# Patient Record
Sex: FEMALE | Race: WHITE | NOT HISPANIC OR LATINO | Employment: FULL TIME | URBAN - NONMETROPOLITAN AREA
[De-identification: names, ages, dates, MRNs, and addresses within clinical notes are randomized per-mention and may not be internally consistent; named-entity substitution may affect disease eponyms.]

---

## 2023-03-02 LAB — SARS-COV-2 RESULT: DETECTED

## 2023-08-17 ENCOUNTER — APPOINTMENT (OUTPATIENT)
Dept: PRIMARY CARE | Facility: CLINIC | Age: 43
End: 2023-08-17
Payer: COMMERCIAL

## 2023-08-26 PROBLEM — Z97.5 IUD CONTRACEPTION: Status: ACTIVE | Noted: 2023-08-26

## 2023-08-26 PROBLEM — E66.01 MORBID OBESITY (MULTI): Status: ACTIVE | Noted: 2023-08-26

## 2023-08-26 PROBLEM — H35.051 CHOROIDAL NEOVASCULARIZATION OF RIGHT EYE: Status: ACTIVE | Noted: 2023-08-26

## 2023-08-26 PROBLEM — R92.8 ABNORMAL MAMMOGRAM: Status: ACTIVE | Noted: 2023-08-26

## 2023-08-26 RX ORDER — MULTIVITAMIN
TABLET ORAL
COMMUNITY

## 2023-08-26 RX ORDER — LEVONORGESTREL 52 MG/1
INTRAUTERINE DEVICE INTRAUTERINE
COMMUNITY

## 2023-08-28 NOTE — PROGRESS NOTES
"Subjective   Patient ID: Kandy Mcmullen is a 42 y.o. female who presents for Annual Exam and weight concerns .    HPI     The patient presents for her annual wellness exam. GYN - Vin[facility]   Last pap/cervical cancer screenin NILM   Mammogram - - due; no breast concerns   LMP/menstrual cycles: yes- spotting randomly   Contraception: Mirena   History of depression or anxiety: no   Immunizations: tdap    Diet: \"not horrible\"- watching portion size, more veggies and chicken and turkey, less red meat ; probably too much pizza ; alcohol once a week   Exercise: Not getting regular exercise; limited by time ; some walking but not consistent   Interested in weight loss medications     Review of Systems   Constitutional:  Negative for chills, fatigue and fever.   HENT:  Negative for congestion, ear pain and sore throat.    Eyes:  Negative for visual disturbance.   Respiratory:  Negative for cough and shortness of breath.    Cardiovascular:  Negative for chest pain, palpitations and leg swelling.   Gastrointestinal:  Negative for abdominal pain, constipation, diarrhea, nausea and vomiting.   Genitourinary: Negative.    Musculoskeletal: Negative.    Skin:  Negative for rash.   Neurological:  Negative for dizziness, weakness, numbness and headaches.   Psychiatric/Behavioral: Negative.         Wt Readings from Last 3 Encounters:   23 120 kg (263 lb 9.6 oz)   23 118 kg (260 lb 3 oz)   22 119 kg (263 lb 3 oz)       Objective   /73 (BP Location: Right arm, Patient Position: Sitting, BP Cuff Size: Large adult)   Pulse 66   Temp 36.8 °C (98.3 °F) (Temporal)   Resp 16   Ht 1.664 m (5' 5.5\")   Wt 120 kg (263 lb 9.6 oz)   LMP 08/15/2023   SpO2 98%   BMI 43.20 kg/m²     Physical Exam    Constitutional: Well developed, well nourished, alert and in no acute distress.  Head and Face: Normocephalic, atraumatic.  Eyes: Normal external exam. Pupils equally round and reactive to light with normal " accommodation and extraocular movements intact.   ENT: External inspection of ears normal, tympanic membranes visualized and normal. Nasal mucosa, septum, and turbinates normal. Oral mucosa moist, oropharynx clear.   Neck: Supple, no lymphadenopathy or masses. Thyroid not enlarged, no palpable nodules.   Cardiovascular: Regular rate and rhythm, normal S1 and S2, no murmurs, gallops, or rubs. Radial pulses normal. No peripheral edema. No carotid bruits.   Pulmonary: No respiratory distress, lungs clear to auscultation bilaterally. No wheezes, rhonchi, rales.   Breasts: Normal appearance, no palpable masses or axillary lymphadenopathy, nipple discharge or inversion, skin changes.   Abdomen: Soft, nontender, nondistended, normal bowel sounds. No masses palpated.   Musculoskeletal: Gait normal. Muscle strength/tone normal of all 4 extremities. Normal range of motion of all extremities.   Skin: Warm, well perfused, normal skin turgor and color, no lesions or rashes noted.   Neurologic: Cranial nerves II-XII grossly intact.  Sensation normal bilaterally.   Psychiatric: Mood calm and affect normal.      Assessment/Plan   Problem List Items Addressed This Visit       IUD contraception    Class 3 severe obesity without serious comorbidity with body mass index (BMI) of 40.0 to 44.9 in adult (CMS/[facility])    Relevant Orders    Referral to Clinical Pharmacy    Referral to Comprehensive Weight Loss     Other Visit Diagnoses       Annual physical exam    -  Primary    Relevant Orders    CBC and Auto Differential    Comprehensive Metabolic Panel    Hemoglobin A1C    TSH with reflex to Free T4 if abnormal    Lipid Panel    Screening mammogram, encounter for        Relevant Orders    BI mammo bilateral screening tomosynthesis          Franca Pulido,   8/29/2023

## 2023-08-29 ENCOUNTER — LAB (OUTPATIENT)
Dept: LAB | Facility: LAB | Age: 43
End: 2023-08-29
Payer: COMMERCIAL

## 2023-08-29 ENCOUNTER — OFFICE VISIT (OUTPATIENT)
Dept: PRIMARY CARE | Facility: CLINIC | Age: 43
End: 2023-08-29
Payer: COMMERCIAL

## 2023-08-29 VITALS
WEIGHT: 263.6 LBS | TEMPERATURE: 98.3 F | BODY MASS INDEX: 42.36 KG/M2 | RESPIRATION RATE: 16 BRPM | OXYGEN SATURATION: 98 % | HEART RATE: 66 BPM | HEIGHT: 66 IN | DIASTOLIC BLOOD PRESSURE: 73 MMHG | SYSTOLIC BLOOD PRESSURE: 111 MMHG

## 2023-08-29 DIAGNOSIS — Z97.5 IUD CONTRACEPTION: ICD-10-CM

## 2023-08-29 DIAGNOSIS — Z00.00 ANNUAL PHYSICAL EXAM: ICD-10-CM

## 2023-08-29 DIAGNOSIS — E66.01 CLASS 3 SEVERE OBESITY WITHOUT SERIOUS COMORBIDITY WITH BODY MASS INDEX (BMI) OF 40.0 TO 44.9 IN ADULT, UNSPECIFIED OBESITY TYPE (MULTI): ICD-10-CM

## 2023-08-29 DIAGNOSIS — Z12.31 SCREENING MAMMOGRAM, ENCOUNTER FOR: ICD-10-CM

## 2023-08-29 DIAGNOSIS — Z00.00 ANNUAL PHYSICAL EXAM: Primary | ICD-10-CM

## 2023-08-29 PROBLEM — E66.813 CLASS 3 SEVERE OBESITY WITHOUT SERIOUS COMORBIDITY WITH BODY MASS INDEX (BMI) OF 40.0 TO 44.9 IN ADULT: Status: ACTIVE | Noted: 2023-08-26

## 2023-08-29 LAB
ALANINE AMINOTRANSFERASE (SGPT) (U/L) IN SER/PLAS: 14 U/L (ref 7–45)
ALBUMIN (G/DL) IN SER/PLAS: 4.5 G/DL (ref 3.4–5)
ALKALINE PHOSPHATASE (U/L) IN SER/PLAS: 77 U/L (ref 33–110)
ANION GAP IN SER/PLAS: 11 MMOL/L (ref 10–20)
ASPARTATE AMINOTRANSFERASE (SGOT) (U/L) IN SER/PLAS: 13 U/L (ref 9–39)
BASOPHILS (10*3/UL) IN BLOOD BY AUTOMATED COUNT: 0.06 X10E9/L (ref 0–0.1)
BASOPHILS/100 LEUKOCYTES IN BLOOD BY AUTOMATED COUNT: 0.7 % (ref 0–2)
BILIRUBIN TOTAL (MG/DL) IN SER/PLAS: 1 MG/DL (ref 0–1.2)
CALCIUM (MG/DL) IN SER/PLAS: 9.6 MG/DL (ref 8.6–10.6)
CARBON DIOXIDE, TOTAL (MMOL/L) IN SER/PLAS: 27 MMOL/L (ref 21–32)
CHLORIDE (MMOL/L) IN SER/PLAS: 106 MMOL/L (ref 98–107)
CHOLESTEROL (MG/DL) IN SER/PLAS: 125 MG/DL (ref 0–199)
CHOLESTEROL IN HDL (MG/DL) IN SER/PLAS: 46 MG/DL
CHOLESTEROL/HDL RATIO: 2.7
CREATININE (MG/DL) IN SER/PLAS: 0.72 MG/DL (ref 0.5–1.05)
EOSINOPHILS (10*3/UL) IN BLOOD BY AUTOMATED COUNT: 0.22 X10E9/L (ref 0–0.7)
EOSINOPHILS/100 LEUKOCYTES IN BLOOD BY AUTOMATED COUNT: 2.4 % (ref 0–6)
ERYTHROCYTE DISTRIBUTION WIDTH (RATIO) BY AUTOMATED COUNT: 13.5 % (ref 11.5–14.5)
ERYTHROCYTE MEAN CORPUSCULAR HEMOGLOBIN CONCENTRATION (G/DL) BY AUTOMATED: 33 G/DL (ref 32–36)
ERYTHROCYTE MEAN CORPUSCULAR VOLUME (FL) BY AUTOMATED COUNT: 92 FL (ref 80–100)
ERYTHROCYTES (10*6/UL) IN BLOOD BY AUTOMATED COUNT: 4.87 X10E12/L (ref 4–5.2)
ESTIMATED AVERAGE GLUCOSE FOR HBA1C: 85 MG/DL
GFR FEMALE: >90 ML/MIN/1.73M2
GLUCOSE (MG/DL) IN SER/PLAS: 99 MG/DL (ref 74–99)
HEMATOCRIT (%) IN BLOOD BY AUTOMATED COUNT: 44.9 % (ref 36–46)
HEMOGLOBIN (G/DL) IN BLOOD: 14.8 G/DL (ref 12–16)
HEMOGLOBIN A1C/HEMOGLOBIN TOTAL IN BLOOD: 4.6 %
IMMATURE GRANULOCYTES/100 LEUKOCYTES IN BLOOD BY AUTOMATED COUNT: 0.5 % (ref 0–0.9)
LDL: 51 MG/DL (ref 0–99)
LEUKOCYTES (10*3/UL) IN BLOOD BY AUTOMATED COUNT: 9.2 X10E9/L (ref 4.4–11.3)
LYMPHOCYTES (10*3/UL) IN BLOOD BY AUTOMATED COUNT: 2.03 X10E9/L (ref 1.2–4.8)
LYMPHOCYTES/100 LEUKOCYTES IN BLOOD BY AUTOMATED COUNT: 22 % (ref 13–44)
MONOCYTES (10*3/UL) IN BLOOD BY AUTOMATED COUNT: 0.55 X10E9/L (ref 0.1–1)
MONOCYTES/100 LEUKOCYTES IN BLOOD BY AUTOMATED COUNT: 6 % (ref 2–10)
NEUTROPHILS (10*3/UL) IN BLOOD BY AUTOMATED COUNT: 6.3 X10E9/L (ref 1.2–7.7)
NEUTROPHILS/100 LEUKOCYTES IN BLOOD BY AUTOMATED COUNT: 68.4 % (ref 40–80)
NRBC (PER 100 WBCS) BY AUTOMATED COUNT: 0 /100 WBC (ref 0–0)
PLATELETS (10*3/UL) IN BLOOD AUTOMATED COUNT: 245 X10E9/L (ref 150–450)
POTASSIUM (MMOL/L) IN SER/PLAS: 4.7 MMOL/L (ref 3.5–5.3)
PROTEIN TOTAL: 7.3 G/DL (ref 6.4–8.2)
SODIUM (MMOL/L) IN SER/PLAS: 139 MMOL/L (ref 136–145)
THYROTROPIN (MIU/L) IN SER/PLAS BY DETECTION LIMIT <= 0.05 MIU/L: 2.04 MIU/L (ref 0.44–3.98)
TRIGLYCERIDE (MG/DL) IN SER/PLAS: 139 MG/DL (ref 0–149)
UREA NITROGEN (MG/DL) IN SER/PLAS: 13 MG/DL (ref 6–23)
VLDL: 28 MG/DL (ref 0–40)

## 2023-08-29 PROCEDURE — 83036 HEMOGLOBIN GLYCOSYLATED A1C: CPT

## 2023-08-29 PROCEDURE — 80053 COMPREHEN METABOLIC PANEL: CPT

## 2023-08-29 PROCEDURE — 1036F TOBACCO NON-USER: CPT | Performed by: FAMILY MEDICINE

## 2023-08-29 PROCEDURE — 99396 PREV VISIT EST AGE 40-64: CPT | Performed by: FAMILY MEDICINE

## 2023-08-29 PROCEDURE — 80061 LIPID PANEL: CPT

## 2023-08-29 PROCEDURE — 85025 COMPLETE CBC W/AUTO DIFF WBC: CPT

## 2023-08-29 PROCEDURE — 3008F BODY MASS INDEX DOCD: CPT | Performed by: FAMILY MEDICINE

## 2023-08-29 PROCEDURE — 36415 COLL VENOUS BLD VENIPUNCTURE: CPT

## 2023-08-29 PROCEDURE — 84443 ASSAY THYROID STIM HORMONE: CPT

## 2023-08-29 ASSESSMENT — PATIENT HEALTH QUESTIONNAIRE - PHQ9
1. LITTLE INTEREST OR PLEASURE IN DOING THINGS: NOT AT ALL
2. FEELING DOWN, DEPRESSED OR HOPELESS: NOT AT ALL
SUM OF ALL RESPONSES TO PHQ9 QUESTIONS 1 AND 2: 0

## 2023-08-29 ASSESSMENT — PROMIS GLOBAL HEALTH SCALE
RATE_QUALITY_OF_LIFE: GOOD
RATE_AVERAGE_PAIN: 1
RATE_GENERAL_HEALTH: GOOD
RATE_PHYSICAL_HEALTH: GOOD
CARRYOUT_SOCIAL_ACTIVITIES: GOOD
EMOTIONAL_PROBLEMS: RARELY
CARRYOUT_PHYSICAL_ACTIVITIES: COMPLETELY
RATE_MENTAL_HEALTH: VERY GOOD
RATE_AVERAGE_FATIGUE: MODERATE
RATE_SOCIAL_SATISFACTION: VERY GOOD

## 2023-08-29 ASSESSMENT — ENCOUNTER SYMPTOMS
SHORTNESS OF BREATH: 0
NAUSEA: 0
MUSCULOSKELETAL NEGATIVE: 1
COUGH: 0
SORE THROAT: 0
CONSTIPATION: 0
PSYCHIATRIC NEGATIVE: 1
FATIGUE: 0
WEAKNESS: 0
HEADACHES: 0
DIARRHEA: 0
PALPITATIONS: 0
VOMITING: 0
DIZZINESS: 0
CHILLS: 0
NUMBNESS: 0
FEVER: 0
ABDOMINAL PAIN: 0

## 2023-11-03 ENCOUNTER — TELEPHONE (OUTPATIENT)
Dept: PRIMARY CARE | Facility: CLINIC | Age: 43
End: 2023-11-03
Payer: COMMERCIAL

## 2023-11-03 DIAGNOSIS — R05.1 ACUTE COUGH: Primary | ICD-10-CM

## 2023-11-03 RX ORDER — BENZONATATE 100 MG/1
100 CAPSULE ORAL 3 TIMES DAILY PRN
Qty: 42 CAPSULE | Refills: 0 | Status: SHIPPED | OUTPATIENT
Start: 2023-11-03 | End: 2023-12-03

## 2023-11-03 NOTE — TELEPHONE ENCOUNTER
Patient is aware and will try the Rx if still feeling sick she will call on Monday for an acute appt.

## 2023-11-03 NOTE — TELEPHONE ENCOUNTER
----- Message from Ifeoma Taylor MA sent at 11/3/2023 10:55 AM EDT -----  Regarding: FW: Cough that just won't stop  Contact: [phone]    ----- Message -----  From: Kandy Mcmullen  Sent: 11/3/2023  10:49 AM EDT  To: [place] [address] Clinical Support Staff  Subject: Cough that just won't stop                       Hi,  I started Tuesday, Oct 24th not feeling well. Run down, sore throat and sinus drainage. Felt better by Wednesday evening. Then about Thursday started to cough every so often...I thought it was going away then the last couple of days seems to be worse. 2 of the last 3 night I have woken up coughing... Slightly runny nose (clear) and this cough that just won't quit. I go through coughing fits from time to time. Just wondering if you have any ideas to help or if I should make an appointment to be seen?    Thanks!!

## 2023-11-03 NOTE — TELEPHONE ENCOUNTER
Rx sent for tessalon (prescription cough pill) that she can use as needed    Can offer acute appt for her to be seen as well in open slot

## 2023-11-06 ENCOUNTER — OFFICE VISIT (OUTPATIENT)
Dept: PRIMARY CARE | Facility: CLINIC | Age: 43
End: 2023-11-06
Payer: COMMERCIAL

## 2023-11-06 VITALS
SYSTOLIC BLOOD PRESSURE: 127 MMHG | OXYGEN SATURATION: 96 % | DIASTOLIC BLOOD PRESSURE: 81 MMHG | RESPIRATION RATE: 16 BRPM | HEART RATE: 87 BPM | TEMPERATURE: 97.5 F | BODY MASS INDEX: 43.84 KG/M2 | WEIGHT: 267.5 LBS

## 2023-11-06 DIAGNOSIS — J06.9 BACTERIAL UPPER RESPIRATORY INFECTION: Primary | ICD-10-CM

## 2023-11-06 DIAGNOSIS — B96.89 BACTERIAL UPPER RESPIRATORY INFECTION: Primary | ICD-10-CM

## 2023-11-06 PROBLEM — O09.529 ADVANCED MATERNAL AGE IN MULTIGRAVIDA (HHS-HCC): Status: ACTIVE | Noted: 2018-09-01

## 2023-11-06 PROCEDURE — 3008F BODY MASS INDEX DOCD: CPT | Performed by: FAMILY MEDICINE

## 2023-11-06 PROCEDURE — 99213 OFFICE O/P EST LOW 20 MIN: CPT | Performed by: FAMILY MEDICINE

## 2023-11-06 PROCEDURE — 1036F TOBACCO NON-USER: CPT | Performed by: FAMILY MEDICINE

## 2023-11-06 RX ORDER — DOXYCYCLINE 100 MG/1
CAPSULE ORAL
Qty: 14 CAPSULE | Refills: 0 | Status: SHIPPED | OUTPATIENT
Start: 2023-11-06

## 2023-11-06 RX ORDER — ALBUTEROL SULFATE 90 UG/1
2 AEROSOL, METERED RESPIRATORY (INHALATION) EVERY 4 HOURS PRN
Qty: 18 G | Refills: 0 | Status: SHIPPED | OUTPATIENT
Start: 2023-11-06 | End: 2024-11-05

## 2023-11-06 ASSESSMENT — ENCOUNTER SYMPTOMS
SWEATS: 0
CHILLS: 0
WHEEZING: 0
HEMOPTYSIS: 0
WEIGHT LOSS: 0
HEARTBURN: 0
MYALGIAS: 0
SORE THROAT: 0
COUGH: 1
HEADACHES: 1
FEVER: 0
SHORTNESS OF BREATH: 0
RHINORRHEA: 1

## 2023-11-06 ASSESSMENT — PAIN SCALES - GENERAL: PAINLEVEL: 0-NO PAIN

## 2023-11-06 NOTE — PROGRESS NOTES
Subjective   Patient ID: Kandy Mcmullen is a 43 y.o. female who presents for Cough (Has had it for 2 weeks, had not tested for COVID had a low grade fever for 1/2 a day and felt run down and little sore throat that has all subsided and the cough is just getting worse ).    HPI   Above chief complaint/information reviewed.  Kandy's upper respiratory illness started about 2 weeks ago, had a fever for just a day or so.  She has no ear pain, digestive symptoms, does have a mild sore throat, postnasal drainage.  Her most bothersome symptom is a cough, occasionally dry, occasionally productive.  She does not wheeze, has no shortness of breath.  Over-the-counter natural remedies and Tessalon Perles have not been helpful.  She has 2 immediate family ill contacts.  One of her kids has pneumonia, the other sounds more like a sinus infection, both needed antibiotics.  Review of Systems  The full, 10+ multi-organ review of systems, is within normal limits with the exception of what is noted above in HPI.  Objective   /81 (BP Location: Right arm, Patient Position: Sitting, BP Cuff Size: Large adult)   Pulse 87   Temp 36.4 °C (97.5 °F) (Temporal)   Resp 16   Wt 121 kg (267 lb 8 oz)   SpO2 96%   BMI 43.84 kg/m²     Physical Exam  Cardiac exam reveals a regular rate and rhythm, no murmurs, rubs or gallops present.   Lungs are clear bilaterally.    No lower extremity edema present.  Constitutional/General appearance: alert, oriented, well-appearing, in no distress  Head and face exam is normal  No scleral icterus or conjunctival erythema present  Hearing is grossly normal  Respiratory effort is normal, no dyspnea noted  Cortical function is normal  Mood, affect, are pleasant, appropriate, and interactive.  Insight is normal  Boggy, inflamed nasal turbinates and mucosa, with purulent D/C bilaterally  Lungs are clear bilaterally, slightly decreased air exchange noted.  Posterior pharynx shows drainage  inflammation.  Bilateral submandibular nodes noted, each about 0.75 cm, soft, mildly tender, mobile.  Assessment/Plan     Given the length of time she has had her sinus and bronchitis symptoms, I recommend doxycycline and an albuterol inhaler.  She may take her Tessalon Perles with it, though she does not believe they have been overly helpful.  Lots of fluids, tea/honey, etc. discussed.  If symptoms do not improve in the next several days, consider short course of prednisone.

## 2023-11-06 NOTE — PROGRESS NOTES
Answers submitted by the patient for this visit:  Cough Questionnaire (Submitted on 11/6/2023)  Chief Complaint: Cough  Chronicity: new  Onset: 1 to 4 weeks ago  Progression since onset: waxing and waning  Frequency: every few minutes  Cough characteristics: non-productive, productive of sputum  chest pain: No  chills: No  ear congestion: No  ear pain: No  fever: No  headaches: Yes  heartburn: No  hemoptysis: No  myalgias: No  nasal congestion: Yes  postnasal drip: No  rash: No  rhinorrhea: Yes  shortness of breath: No  sore throat: No  sweats: No  weight loss: No  wheezing: No  Aggravated by: cold air

## 2024-05-02 ENCOUNTER — TELEPHONE (OUTPATIENT)
Dept: PRIMARY CARE | Facility: CLINIC | Age: 44
End: 2024-05-02
Payer: COMMERCIAL

## 2024-05-02 DIAGNOSIS — E66.01 CLASS 3 SEVERE OBESITY WITHOUT SERIOUS COMORBIDITY WITH BODY MASS INDEX (BMI) OF 40.0 TO 44.9 IN ADULT, UNSPECIFIED OBESITY TYPE (MULTI): Primary | ICD-10-CM

## 2024-05-02 NOTE — TELEPHONE ENCOUNTER
----- Message from Trina Aguilar CMA sent at 5/2/2024 11:09 AM EDT -----  Regarding: FW: Weight loss referral?  Contact: [phone]    ----- Message -----  From: Kandy Mcmullen  Sent: 5/2/2024  11:05 AM EDT  To: [address] [place] [address] Clinical Support Staff  Subject: Weight loss referral?                            I called the bariatric number and they told me that they only do weight loss for people wanting surgery or who have had surgery... Not sure where to go next... I realize it has been a bit since the referral but I need to do something and I feel like everything I am trying to is just not working.    Thank you,  Kandy

## 2024-05-02 NOTE — TELEPHONE ENCOUNTER
----- Message from Trina Aguilar CMA sent at 5/2/2024 11:09 AM EDT -----  Regarding: FW: Weight loss referral?  Contact: [phone]    ----- Message -----  From: Kandy Mcmullen  Sent: 5/2/2024  11:05 AM EDT  To: [address] [place] [address] Clinical Support Staff  Subject: Weight loss referral?                            I called the bariatric number and they told me that they only do weight loss for people wanting surgery or who have had surgery... Not sure where to go next... I realize it has been a bit since the referral but I need to do something and I feel like everything I am trying to is just not working.    Thank you,  Kandy

## 2024-09-06 ENCOUNTER — APPOINTMENT (OUTPATIENT)
Dept: ENDOCRINOLOGY | Facility: CLINIC | Age: 44
End: 2024-09-06
Payer: COMMERCIAL

## 2024-09-06 ENCOUNTER — TELEMEDICINE CLINICAL SUPPORT (OUTPATIENT)
Dept: ENDOCRINOLOGY | Facility: CLINIC | Age: 44
End: 2024-09-06
Payer: COMMERCIAL

## 2024-09-06 VITALS
BODY MASS INDEX: 43.33 KG/M2 | SYSTOLIC BLOOD PRESSURE: 123 MMHG | WEIGHT: 269.6 LBS | TEMPERATURE: 97.5 F | DIASTOLIC BLOOD PRESSURE: 79 MMHG | HEIGHT: 66 IN | HEART RATE: 80 BPM

## 2024-09-06 VITALS — WEIGHT: 269.6 LBS | HEIGHT: 66 IN | BODY MASS INDEX: 43.33 KG/M2

## 2024-09-06 DIAGNOSIS — Z71.3 DIETARY COUNSELING: Primary | ICD-10-CM

## 2024-09-06 DIAGNOSIS — E66.01 CLASS 3 SEVERE OBESITY WITHOUT SERIOUS COMORBIDITY WITH BODY MASS INDEX (BMI) OF 40.0 TO 44.9 IN ADULT, UNSPECIFIED OBESITY TYPE (MULTI): Primary | ICD-10-CM

## 2024-09-06 DIAGNOSIS — E66.01 CLASS 3 SEVERE OBESITY WITHOUT SERIOUS COMORBIDITY WITH BODY MASS INDEX (BMI) OF 40.0 TO 44.9 IN ADULT, UNSPECIFIED OBESITY TYPE (MULTI): ICD-10-CM

## 2024-09-06 PROCEDURE — 1036F TOBACCO NON-USER: CPT | Performed by: NURSE PRACTITIONER

## 2024-09-06 PROCEDURE — 3008F BODY MASS INDEX DOCD: CPT | Performed by: NURSE PRACTITIONER

## 2024-09-06 PROCEDURE — 99204 OFFICE O/P NEW MOD 45 MIN: CPT | Performed by: NURSE PRACTITIONER

## 2024-09-06 PROCEDURE — 97802 MEDICAL NUTRITION INDIV IN: CPT | Performed by: DIETITIAN, REGISTERED

## 2024-09-06 NOTE — Clinical Note
Thanks for referral.  She wants to do diet and exercise.  I will connect her with our dietician.  She will look into weight loss medications covered by her insurance.  Discussed her not being candidate for GLP1.   Waleska Kim, HENRY-CNP

## 2024-09-06 NOTE — PROGRESS NOTES
"Initial Nutrition Assessment    Patient was referred to nutrition by MARGARITA Larson for weight management/desire to lose weight.     Diet recall reveals a consistent meal pattern with rare missed meals; however, reported intakes of larger portions and some calorically dense foods all likely contributing to lack of desired weight loss at this time. Fluids meeting recommendations in type and amount with water as primary beverage. See all interventions/recommendations below as discussed during visit this day.     Patient reported symptoms: Difficulty losing weight    Anthropometrics:  Height:   Ht Readings from Last 1 Encounters:   09/06/24 1.676 m (5' 6\")      Weight:   Wt Readings from Last 10 Encounters:   09/06/24 122 kg (269 lb 9.6 oz)   11/06/23 121 kg (267 lb 8 oz)   08/29/23 120 kg (263 lb 9.6 oz)   03/02/23 118 kg (260 lb 3 oz)   06/28/22 119 kg (263 lb 3 oz)   05/11/21 125 kg (274 lb 8 oz)      Current BMI:   BMI Readings from Last 1 Encounters:   09/06/24 43.51 kg/m²        Labs:  Lab Results   Component Value Date    HGBA1C 4.6 08/29/2023      Lab Results   Component Value Date    CHOL 125 08/29/2023    CHOL 118 06/28/2022    CHOL 135 05/11/2021     Lab Results   Component Value Date    HDL 46.0 08/29/2023    HDL 41.5 06/28/2022    HDL 51.6 05/11/2021     No results found for: \"LDLCALC\"  Lab Results   Component Value Date    TRIG 139 08/29/2023    TRIG 147 06/28/2022    TRIG 117 05/11/2021       Malnutrition Screening:  Significant Unintentional weight loss: No  Eating less than 75% of usual intake for more than 2 weeks: No  Potential Signs of Inflammation: No    Recommended Malnutrition Diagnosis: No malnutrition identified    Diet Recall-  Breakfast: egg and cheese sandwich OR cottage cheese OR whole grain toast with butter OR on weekends has eggs and sausage and toast OR pancakes and sausage OR skips when forgets   Lunch- leftovers from dinner  Dinner- various proteins, starches and vegetables "   Snacks- [place] break nut and cheese packs, fruits, nuts, granola bar, veggies    Beverages-coffee with milk and honey, water throughout the day and occasional unsweetened hot or iced tea  Alcohol- beer, wine, cocktails (1 drink per week)    Other pertinent patient reported Information:  - Feels barrier to weight loss is overall portion sizes as well as trying to agree to foods/meals that everyone in the house will eat.    Nutrition Diagnosis: Food and nutrition-related knowledge deficit related to lack of recent exposure to information as evidenced by patient has no prior knowledge of need for food and nutrition-related recommendations.    Readiness to Learn:  Cognitive ability: Alert and oriented  Motivation to learn: Interested  Family Support: Unable to assess- family not present  Instruction provided to: Patient  Patient learns best by: Multiple methods  Factors affecting learning: None   Physical limitations affecting learning: None    Education Materials Provided:   Your Mediterranean Meal Plan Booklet    Nutrition Interventions/Recommendations for 9/6/2024:  - Please refer to your book entitled: Your Mediterranean Meal Plan, and follow Mediterranean Diet eating guidelines as reviewed.  - The Healthy Plate style of eating can be a helpful tool for incorporating healthy balanced meals in appropriate portions. (Healthy Plate: Start with a 9-inch diameter plate. Fill 1/2 the plate with non-starchy vegetables, 1/4 of the plate with whole grains or starchy vegetables, and 1/4 of the plate with a lean source of protein.   - Consider pre-planning healthy meals for the week. Refer to your book for both menu and recipe ideas to get you started.  - Further recipes can also be found at: [email or web address]/recipes  - Incorporate strategies of mindful eating every day. Practice staying in tune with your body's hunger cues and eat only when truly hungry. Avoid emotional eating/eating when not hungry.  - Be mindful of  duration of meal, aiming to make meals last for at least 20 minutes. Strategies that can help slow meals down include chewing foods 20 times per bite, putting the fork down between bites, using your non dominant hand to hold the utensil and drinking water between bites.   - Aim for 64 ounces of water daily.  - Follow-up with nutrition in November as scheduled.      Nutrition Monitoring & Evaluation: adherence to recommendations and patient stated goals    Need for follow-up:  November as scheduled    Referred by: HENRY Larson-CNP    MNT Billing Type: Medical Nutrition Assessment, each 15 min increment, for 3 increments.    SIGNATURE:   Charity Khan RD, CSOWM, LD, CDCES                                                        DATE:   9/6/2024

## 2024-09-06 NOTE — PATIENT INSTRUCTIONS
- Please refer to your book entitled: Your Mediterranean Meal Plan, and follow Mediterranean Diet eating guidelines as reviewed.  - The Healthy Plate style of eating can be a helpful tool for incorporating healthy balanced meals in appropriate portions. (Healthy Plate: Start with a 9-inch diameter plate. Fill 1/2 the plate with non-starchy vegetables, 1/4 of the plate with whole grains or starchy vegetables, and 1/4 of the plate with a lean source of protein.   - Consider pre-planning healthy meals for the week. Refer to your book for both menu and recipe ideas to get you started.  - Further recipes can also be found at: [email or web address]/recipes  - Incorporate strategies of mindful eating every day. Practice staying in tune with your body's hunger cues and eat only when truly hungry. Avoid emotional eating/eating when not hungry.  - Be mindful of duration of meal, aiming to make meals last for at least 20 minutes. Strategies that can help slow meals down include chewing foods 20 times per bite, putting the fork down between bites, using your non dominant hand to hold the utensil and drinking water between bites.   - Aim for 64 ounces of water daily.  - Follow-up with nutrition in November as scheduled.

## 2024-09-06 NOTE — PROGRESS NOTES
Subjective   Kandy Mcmullen is a 43 y.o. female who presents for medical evaluation of non-surgical weight management.       I am seeing this patient in consultation as per referral by Dr. Franca Pulido for evaluation of her obesity and associated medical problems.     Overweight: Weight started to be of concern into childhood.  Rate of weight gain is described as gradual . Gained a good amount in college and lost prior to marraite.  Has yo-yo'd over the last several years.  Family history positive for obesity. Previous treatments include: lark through her workplace, self directed dieting.   Factors associated with weight gain: portion size, emotional eating, lack of activity.       Tried to scheduled follow up with dietician who was now working in bariatric surgery  She is not interested in bariatric surgery    She was referred by her PCP  Busy at home, has two small children   works variable hours so sometimes she feels like a single mom   Over the years has made adjustments to her diet   Has cut out chips and crackers as snacks   She is trying to do more fruits and nuts and veggies as snacks   She is trying to make a lifestyle change   Her  and especially youngest son would also benefit   She feels her biggest issue is portion size    Hybrid work schedule works for home and goes into office couple days per week   She spoke with PCP and was referred to us   Discussed with PCP KATT like wegovy etc and was cautioned given maternal aunt and cousin with medullary thyroid cancer.        DIET:   Breakfast: egg and cheese sandwich, or cut of cottage cheese,  whole grain toast with butter  Lunch: leftovers  Dinner: last night has garlic honey chicken with rice with carrots.   Snacks: s[place]ento break nut and cheese, fruits, nuts, granola bar   Drinks: coffee with milk and honey, water.  Occasional hot or ice tea - unsweetened  Alcohol: beer, wine, cocktails.  1 drink per week      EXERCISE:  Patient day  "to day activity   May walk at bus stop   Was doing 7 minute yoga for weight loss      QUALITY OF SLEEP PER NIGHT:  Patient get 7 hours of sleep on average per night.  Patient has never been diagnosed with CHARLES.  Snoring when sick/congested only.       LEVEL OF STRESS:  Mild- moderate related to life, work     APPETITE CONTROL:   Variable     ROS  General: no fever, chills.  Weight see HPI  Skin: no rashes, pruritis or dry skin  Cardiac: denies chest pain, heart palpitations or orthopnea  Pulmonary: denies wheezing, productive cough or exertional dyspnea      Objective    Physical Exam  Blood pressure 123/79, pulse 80, temperature 36.4 °C (97.5 °F), temperature source Temporal, height 1.676 m (5' 6\"), weight 122 kg (269 lb 9.6 oz).  General: not in acute distress, cooperative   Respiratory: normal respiratory effort  Musculoskeletal: normal gait       Current Outpatient Medications:     albuterol (ProAir HFA) 90 mcg/actuation inhaler, Inhale 2 puffs every 4 hours if needed for other (cough). Ok to subst. per formulary, Disp: 18 g, Rfl: 0    doxycycline (Vibramycin) 100 mg capsule, Take 1 capsule twice daily with food until gone, Disp: 14 capsule, Rfl: 0    levonorgestrel (Mirena) 21 mcg/24 hours (8 yrs) 52 mg IUD, by intrauterine route., Disp: , Rfl:     multivitamin (Daily Multi-Vitamin) tablet, Take by mouth., Disp: , Rfl:     Assessment/Plan   Problem List Items Addressed This Visit       Class 3 severe obesity without serious comorbidity with body mass index (BMI) of 40.0 to 44.9 in adult (Multi) - Primary    Relevant Orders    Referral to Nutrition Services   Comment: BMI not at goal.  She report never being able to get less than 205 lbs.  She has been up and down in her weight over the years.  She is looking for a lifestyle change and interested in doing with diet and exercise.  She is looking for a set meal plan she can follow and interested in meeting with dietician.  Not looking to do group class at this time " (she lives far away).  Discussed weight loss medications, and she will look into options.  Would advise against wegovy or zepbound due to family history of medullary thyroid cancer.  Overall she appears most interested in Qsymia.  She will check insurance coverage and let me know.  Discussed 5% weight loss goal with these medications and rules with phentermine if she chooses.  Discussed goals for exercise.      Plan:   Look into coverage for:   phentermine 37.5 mg once daily  Qsymia 3.75-46 mg once daily   Contrave 9-80 mg 2 pills twice daily   Meet with Charity our dietician, virtual or in person   Follow up with me 4 months

## 2024-09-06 NOTE — PATIENT INSTRUCTIONS
Look into coverage for:   phentermine 37.5 mg once daily  Qsymia 3.75-46 mg once daily   Contrave 9-80 mg 2 pills twice daily     Meet with Charity our dietician, virtual or in person     Follow up with me 4 months

## 2024-09-24 ENCOUNTER — HOSPITAL ENCOUNTER (OUTPATIENT)
Dept: RADIOLOGY | Facility: CLINIC | Age: 44
Discharge: HOME | End: 2024-09-24
Payer: COMMERCIAL

## 2024-09-24 DIAGNOSIS — Z12.31 SCREENING MAMMOGRAM FOR BREAST CANCER: ICD-10-CM

## 2024-09-24 PROCEDURE — 77067 SCR MAMMO BI INCL CAD: CPT | Performed by: RADIOLOGY

## 2024-09-24 PROCEDURE — 77063 BREAST TOMOSYNTHESIS BI: CPT | Performed by: RADIOLOGY

## 2024-09-24 PROCEDURE — 77067 SCR MAMMO BI INCL CAD: CPT

## 2024-11-01 ENCOUNTER — APPOINTMENT (OUTPATIENT)
Dept: ENDOCRINOLOGY | Facility: CLINIC | Age: 44
End: 2024-11-01
Payer: COMMERCIAL

## 2024-11-01 VITALS — BODY MASS INDEX: 42.91 KG/M2 | WEIGHT: 267 LBS | HEIGHT: 66 IN

## 2024-11-01 DIAGNOSIS — Z71.3 DIETARY COUNSELING: ICD-10-CM

## 2024-11-01 PROCEDURE — 97803 MED NUTRITION INDIV SUBSEQ: CPT | Performed by: DIETITIAN, REGISTERED

## 2025-01-09 ENCOUNTER — APPOINTMENT (OUTPATIENT)
Dept: ENDOCRINOLOGY | Facility: CLINIC | Age: 45
End: 2025-01-09
Payer: COMMERCIAL

## 2025-01-09 ENCOUNTER — PATIENT MESSAGE (OUTPATIENT)
Dept: ENDOCRINOLOGY | Facility: CLINIC | Age: 45
End: 2025-01-09

## 2025-01-09 VITALS — BODY MASS INDEX: 43.58 KG/M2 | WEIGHT: 270 LBS

## 2025-01-09 DIAGNOSIS — E66.813 CLASS 3 SEVERE OBESITY WITHOUT SERIOUS COMORBIDITY WITH BODY MASS INDEX (BMI) OF 40.0 TO 44.9 IN ADULT, UNSPECIFIED OBESITY TYPE: Primary | ICD-10-CM

## 2025-01-09 DIAGNOSIS — E66.01 CLASS 3 SEVERE OBESITY WITHOUT SERIOUS COMORBIDITY WITH BODY MASS INDEX (BMI) OF 40.0 TO 44.9 IN ADULT, UNSPECIFIED OBESITY TYPE: Primary | ICD-10-CM

## 2025-01-09 PROCEDURE — 1036F TOBACCO NON-USER: CPT | Performed by: NURSE PRACTITIONER

## 2025-01-09 PROCEDURE — 99214 OFFICE O/P EST MOD 30 MIN: CPT | Performed by: NURSE PRACTITIONER

## 2025-01-09 ASSESSMENT — PATIENT HEALTH QUESTIONNAIRE - PHQ9
2. FEELING DOWN, DEPRESSED OR HOPELESS: NOT AT ALL
1. LITTLE INTEREST OR PLEASURE IN DOING THINGS: NOT AT ALL
SUM OF ALL RESPONSES TO PHQ9 QUESTIONS 1 AND 2: 0

## 2025-01-09 ASSESSMENT — ENCOUNTER SYMPTOMS
OCCASIONAL FEELINGS OF UNSTEADINESS: 0
LOSS OF SENSATION IN FEET: 0
DEPRESSION: 0

## 2025-01-09 NOTE — PROGRESS NOTES
Subjective   Kandy Mcmullen is a 44 y.o. female who presents for medical evaluation of non-surgical weight management.       Overweight: Weight started to be of concern into childhood.  Rate of weight gain is described as gradual . Gained a good amount in college and lost prior to marriage.  Has yo-yo'd over the last several years.  Family history positive for obesity. Previous treatments include: lark through her workplace, self directed dieting.   Factors associated with weight gain: portion size, emotional eating, lack of activity.       Tried to scheduled follow up with dietician who was now working in bariatric surgery  She is not interested in bariatric surgery    She was referred by her PCP  Busy at home, has two small children   works variable hours so sometimes she feels like a single mom   Over the years has made adjustments to her diet   Has cut out chips and crackers as snacks   She is trying to do more fruits and nuts and veggies as snacks   She is trying to make a lifestyle change   Her  and especially youngest son would also benefit   She feels her biggest issue is portion size    Hybrid work schedule works for home and goes into office couple days per week   She spoke with PCP and was referred to us   Discussed with PCP KATT like wegovy etc and was cautioned given maternal aunt and cousin with medullary thyroid cancer.      Since last visit, has met with Charity   She is finding more ideas for meals   She feels this has been helpful  Tries to meal plan but does not always   Reports some holiday diet indiscretion but now had a better week       DIET:   Trying to meal prep  They have added protein shake as option for breakfast   No other changes      EXERCISE:  Patient day to day activity   She has been on and off with yoga  She is using a weight loss [employer]an for women for exercise      QUALITY OF SLEEP PER NIGHT:  Patient get 7 hours of sleep on average per night.  Patient has never been  diagnosed with CHARLES.  Snoring when sick/congested only.    No change      LEVEL OF STRESS:  Mild- moderate related to life, work  No change      APPETITE CONTROL:   Variable    ROS  General: no fever, chills.  Weight see HPI  Skin: no rashes, pruritis or dry skin  Cardiac: denies chest pain, heart palpitations or orthopnea  Pulmonary: denies wheezing, productive cough or exertional dyspnea      Objective    Physical Exam  Weight 122 kg (270 lb).  Unable to obtain blood pressure today due to virtual visit  General Appearance: Well appearing, alert, in no acute distress, well-hydrated, well nourished.  Affect: alert, cooperative         Current Outpatient Medications:     levonorgestrel (Mirena) 21 mcg/24 hours (8 yrs) 52 mg IUD, by intrauterine route., Disp: , Rfl:     multivitamin (Daily Multi-Vitamin) tablet, Take by mouth., Disp: , Rfl:     Assessment & Plan  Class 3 severe obesity without serious comorbidity with body mass index (BMI) of 40.0 to 44.9 in adult, unspecified obesity type         BMI 40.0-44.9, adult (Multi)       Comment: BMI not at goal.  Reports some diet indiscretion over the holidays.  She feels this week was better and she is back on track.  She is still interested in managing her weight with diet and exercise.  Reinforced plate method.  Today discussed getting good amount of protein with meals and snacks.  Discussed amounts.  Recommend follow up with dietician.  She does not want to try weight loss medications at this time.  They were also not covered with her insurance.      Plan:   Continue to meet with Charity   Add protein to all meals and snacks   If needed she can make follow up with me to discuss weight loss medications

## 2025-01-24 ENCOUNTER — APPOINTMENT (OUTPATIENT)
Dept: ENDOCRINOLOGY | Facility: CLINIC | Age: 45
End: 2025-01-24
Payer: COMMERCIAL

## 2025-01-24 VITALS — WEIGHT: 269 LBS | HEIGHT: 66 IN | BODY MASS INDEX: 43.23 KG/M2

## 2025-01-24 DIAGNOSIS — Z71.3 DIETARY COUNSELING: ICD-10-CM

## 2025-01-24 PROCEDURE — 97803 MED NUTRITION INDIV SUBSEQ: CPT | Performed by: DIETITIAN, REGISTERED

## 2025-01-24 NOTE — PROGRESS NOTES
Follow-up Nutrition Assessment    Interval History: Patient presents for follow-up nutrition appointment for weight management/desire to lose weight. Since last nutrition visit on 11/1/24, pt with a 2 lbs weight gain. States things are going fairly well with healthier eating now with the holidays being over. States is she is eating out, making healthier menu choices. Further reports making an effort to incorporate good sources of protein such as with cottage cheese and chicken and protein shakes she often has. Having avocado toast with cottage cheese for breakfast, leftovers from dinner for lunch, and then dinners having various proteins, starches and vegetables. Using 'go to' recipes for healthy meals often that she has created a folder of. Beverages include coffee in am with milk and honey with then unsweetened tea and water during the day. With regards to exercise, has been adding in some exercise but not yet as consistent as she wants it to be by report. Does a 7-minute HIIT video approximately twice weekly now (weight loss for women work outs). See all interventions/recommendations below as discussed during visit this day.     Nutrition Interventions/Recommendations from last visit scheduled on 11/1/24:  - Continue to use the Healthy Plate style of eating as a tool for incorporating healthy balanced meals in appropriate portions. (Healthy Plate: Start with a 9-inch diameter plate. Fill 1/2 the plate with non-starchy vegetables, 1/4 of the plate with whole grains or starchy vegetables, and 1/4 of the plate with a lean source of protein.   - Pre-plan healthy meals from various recipes and jot down in your book as go to food options for the week.  -  Recipes can be found at: [email or web address]/recipes and at: https://diabetesfoodhub.org/recipes which is nice because they provide nutrition breakdowns of recipes where then you can focus on recipes that stay within 500 calories or less per serving as a healthy  "meal option.   - Pre-plan healthy snacks that have a good source of protein and fiber as discussed.  - Continue to aim for 64 ounces of water daily.  - Aim for 150 minutes of moderate-intensity physical activity per week. Resistance training is encouraged at least twice weekly.  - Follow-up with nutrition in January as scheduled.      Adherence to recommendations and patient stated goals: Partially met goals    Anthropometrics:  Height:   Ht Readings from Last 1 Encounters:   11/01/24 1.676 m (5' 6\")      Weight:   Wt Readings from Last 10 Encounters:   01/09/25 122 kg (270 lb)   11/01/24 121 kg (267 lb)   09/06/24 122 kg (269 lb 9.6 oz)   09/06/24 122 kg (269 lb 9.6 oz)   11/06/23 121 kg (267 lb 8 oz)   08/29/23 120 kg (263 lb 9.6 oz)   03/02/23 118 kg (260 lb 3 oz)   06/28/22 119 kg (263 lb 3 oz)   05/11/21 125 kg (274 lb 8 oz)      Current BMI:   BMI Readings from Last 1 Encounters:   01/09/25 43.58 kg/m²        Malnutrition Screening:  Significant Unintentional weight loss: No  Eating less than 75% of usual intake for more than 2 weeks: No  Potential Signs of Inflammation: No    Recommended Malnutrition Diagnosis: No malnutrition identified    Patient reported Information:  - Feels things are going fairly well with healthier eating now with the holidays being over.   - If eating out, making healthier menu choices.  - Making an effort to incorporate good sources of protein such as with cottage cheese and chicken and protein shakes she often has.  - Having avocado toast with cottage cheese for breakfast, leftovers from dinner for lunch, and then dinners having various proteins, starches and vegetables.   - Using 'go to' recipes for healthy meals often.  - Beverages include coffee in am with milk and honey with then unsweetened tea and water during the day.   - Has been adding in some exercise but not yet as consistent as she wants. Does a 7-minute HIIT video approximately twice weekly (weight loss for women work " outs).    Nutrition Diagnosis: Food and nutrition-related knowledge deficit related to lack of recent exposure to information as evidenced by patient has no prior knowledge of need for food and nutrition-related recommendations.     Readiness to Learn:  Cognitive ability: Alert and oriented  Motivation to learn: Interested  Family Support: Unable to assess- family not present  Instruction provided to: Patient  Patient learns best by: Multiple methods  Factors affecting learning: None   Physical limitations affecting learning: None    Education Materials Provided:   None this visit    Nutrition Interventions/Recommendations for 1/24/2025:  - Continue to use the Healthy Plate style of eating as a tool for incorporating healthy balanced meals in appropriate portions. (Healthy Plate: Start with a 9-inch diameter plate. Fill 1/2 the plate with non-starchy vegetables, 1/4 of the plate with whole grains or starchy vegetables, and 1/4 of the plate with a lean source of protein.   - Continue to aim to pre-plan healthy meals from various recipes.  -  Further recipes can be found at: Https://[employer]/recipes and at: https://diabetesfoodhub.org/recipes.   - Continue to pre-plan healthy snacks that have a good source of protein and fiber as discussed.  - Continue to aim for 64 ounces of water daily.  - Aim for consistent exercise weekly.  - Follow-up with nutrition in March as scheduled.     Nutrition Monitoring & Evaluation: adherence to recommendations and patient stated goals    Need for follow-up:  March as scheduled    Referred by: HENRY Larson-CNP    MNT Billing Type: Medical Nutrition Re-Assessment, each 15 min increment, for 2 increments.    SIGNATURE:   Charity Khan RD, CSDANIKA, LD, [facility]                                                          DATE:   1/24/2025

## 2025-01-24 NOTE — PATIENT INSTRUCTIONS
- Continue to use the Healthy Plate style of eating as a tool for incorporating healthy balanced meals in appropriate portions. (Healthy Plate: Start with a 9-inch diameter plate. Fill 1/2 the plate with non-starchy vegetables, 1/4 of the plate with whole grains or starchy vegetables, and 1/4 of the plate with a lean source of protein.   - Continue to aim to pre-plan healthy meals from various recipes.  -  Further recipes can be found at: Https://[employer].org/recipes and at: https://diabetesfoodhub.org/recipes.   - Continue to pre-plan healthy snacks that have a good source of protein and fiber as discussed.  - Continue to aim for 64 ounces of water daily.  - Aim for consistent exercise weekly.  - Follow-up with nutrition in March as scheduled.   
Appropriate

## 2025-03-28 ENCOUNTER — APPOINTMENT (OUTPATIENT)
Dept: ENDOCRINOLOGY | Facility: CLINIC | Age: 45
End: 2025-03-28
Payer: COMMERCIAL

## 2025-04-07 ENCOUNTER — APPOINTMENT (OUTPATIENT)
Dept: ENDOCRINOLOGY | Facility: CLINIC | Age: 45
End: 2025-04-07
Payer: COMMERCIAL

## 2025-10-01 ENCOUNTER — APPOINTMENT (OUTPATIENT)
Dept: RADIOLOGY | Facility: CLINIC | Age: 45
End: 2025-10-01
Payer: COMMERCIAL

## 2025-10-01 DIAGNOSIS — Z12.31 SCREENING MAMMOGRAM FOR BREAST CANCER: ICD-10-CM

## 2025-12-16 ENCOUNTER — APPOINTMENT (OUTPATIENT)
Dept: PRIMARY CARE | Facility: CLINIC | Age: 45
End: 2025-12-16
Payer: COMMERCIAL